# Patient Record
Sex: MALE | Race: WHITE | HISPANIC OR LATINO | Employment: FULL TIME | URBAN - NONMETROPOLITAN AREA
[De-identification: names, ages, dates, MRNs, and addresses within clinical notes are randomized per-mention and may not be internally consistent; named-entity substitution may affect disease eponyms.]

---

## 2023-01-29 ENCOUNTER — HOSPITAL ENCOUNTER (EMERGENCY)
Facility: HOSPITAL | Age: 28
Discharge: HOME | End: 2023-01-29
Attending: EMERGENCY MEDICINE

## 2023-01-29 ENCOUNTER — APPOINTMENT (EMERGENCY)
Dept: CT IMAGING | Facility: HOSPITAL | Age: 28
End: 2023-01-29

## 2023-01-29 VITALS
RESPIRATION RATE: 16 BRPM | OXYGEN SATURATION: 98 % | HEART RATE: 84 BPM | TEMPERATURE: 98.4 F | WEIGHT: 175 LBS | SYSTOLIC BLOOD PRESSURE: 119 MMHG | DIASTOLIC BLOOD PRESSURE: 66 MMHG

## 2023-01-29 DIAGNOSIS — M89.8X5: Primary | ICD-10-CM

## 2023-01-29 LAB
ALBUMIN SERPL BCP-MCNC: 4.2 G/DL (ref 3.5–5)
ALP SERPL-CCNC: 94 U/L (ref 34–104)
ALT SERPL W P-5'-P-CCNC: 16 U/L (ref 7–52)
ANION GAP SERPL CALCULATED.3IONS-SCNC: 10 MMOL/L (ref 4–13)
AST SERPL W P-5'-P-CCNC: 14 U/L (ref 13–39)
BASOPHILS # BLD AUTO: 0.02 THOUSANDS/ÂΜL (ref 0–0.1)
BASOPHILS NFR BLD AUTO: 0 % (ref 0–1)
BILIRUB SERPL-MCNC: 0.6 MG/DL (ref 0.2–1)
BUN SERPL-MCNC: 15 MG/DL (ref 5–25)
CALCIUM SERPL-MCNC: 9.1 MG/DL (ref 8.4–10.2)
CHLORIDE SERPL-SCNC: 101 MMOL/L (ref 96–108)
CO2 SERPL-SCNC: 26 MMOL/L (ref 21–32)
CREAT SERPL-MCNC: 0.73 MG/DL (ref 0.6–1.3)
EOSINOPHIL # BLD AUTO: 0.16 THOUSAND/ÂΜL (ref 0–0.61)
EOSINOPHIL NFR BLD AUTO: 2 % (ref 0–6)
ERYTHROCYTE [DISTWIDTH] IN BLOOD BY AUTOMATED COUNT: 12.2 % (ref 11.6–15.1)
GFR SERPL CREATININE-BSD FRML MDRD: 128 ML/MIN/1.73SQ M
GLUCOSE SERPL-MCNC: 127 MG/DL (ref 65–140)
HCT VFR BLD AUTO: 45.9 % (ref 36.5–49.3)
HGB BLD-MCNC: 15.4 G/DL (ref 12–17)
IMM GRANULOCYTES # BLD AUTO: 0.04 THOUSAND/UL (ref 0–0.2)
IMM GRANULOCYTES NFR BLD AUTO: 0 % (ref 0–2)
LYMPHOCYTES # BLD AUTO: 2.48 THOUSANDS/ÂΜL (ref 0.6–4.47)
LYMPHOCYTES NFR BLD AUTO: 23 % (ref 14–44)
MCH RBC QN AUTO: 28.9 PG (ref 26.8–34.3)
MCHC RBC AUTO-ENTMCNC: 33.6 G/DL (ref 31.4–37.4)
MCV RBC AUTO: 86 FL (ref 82–98)
MONOCYTES # BLD AUTO: 0.78 THOUSAND/ÂΜL (ref 0.17–1.22)
MONOCYTES NFR BLD AUTO: 7 % (ref 4–12)
NEUTROPHILS # BLD AUTO: 7.36 THOUSANDS/ÂΜL (ref 1.85–7.62)
NEUTS SEG NFR BLD AUTO: 68 % (ref 43–75)
NRBC BLD AUTO-RTO: 0 /100 WBCS
PLATELET # BLD AUTO: 280 THOUSANDS/UL (ref 149–390)
PMV BLD AUTO: 10.5 FL (ref 8.9–12.7)
POTASSIUM SERPL-SCNC: 3.2 MMOL/L (ref 3.5–5.3)
PROT SERPL-MCNC: 7.4 G/DL (ref 6.4–8.4)
RBC # BLD AUTO: 5.33 MILLION/UL (ref 3.88–5.62)
SODIUM SERPL-SCNC: 137 MMOL/L (ref 135–147)
WBC # BLD AUTO: 10.84 THOUSAND/UL (ref 4.31–10.16)

## 2023-01-29 RX ADMIN — IOHEXOL 98 ML: 350 INJECTION, SOLUTION INTRAVENOUS at 04:32

## 2023-02-02 ENCOUNTER — TELEPHONE (OUTPATIENT)
Dept: OBGYN CLINIC | Facility: CLINIC | Age: 28
End: 2023-02-02

## 2023-02-02 NOTE — TELEPHONE ENCOUNTER
---Tried several times through out the day  Message on his phone said subscriber is not in service     -- Message from Leopoldo Holland sent at 2/1/2023  6:01 PM EST -----  This patient has x-rays that were done at [facility] in 2020  It would be best if this patient can get these x-rays to be used in comparison to new x-rays  I would suggest he be contacted and informed that it is very important for old x-rays to be available for review    Thank you

## 2023-02-03 ENCOUNTER — OFFICE VISIT (OUTPATIENT)
Dept: OBGYN CLINIC | Facility: CLINIC | Age: 28
End: 2023-02-03

## 2023-02-03 ENCOUNTER — HOSPITAL ENCOUNTER (OUTPATIENT)
Dept: RADIOLOGY | Facility: CLINIC | Age: 28
End: 2023-02-03

## 2023-02-03 VITALS
HEART RATE: 89 BPM | WEIGHT: 188 LBS | TEMPERATURE: 98 F | BODY MASS INDEX: 24.92 KG/M2 | HEIGHT: 73 IN | SYSTOLIC BLOOD PRESSURE: 122 MMHG | DIASTOLIC BLOOD PRESSURE: 75 MMHG

## 2023-02-03 DIAGNOSIS — D16.22 OSTEOCHONDROMA OF FEMUR, LEFT: ICD-10-CM

## 2023-02-03 DIAGNOSIS — M79.605 LEFT LEG PAIN: Primary | ICD-10-CM

## 2023-02-03 DIAGNOSIS — D16.22 OSTEOCHONDROMA OF FEMUR, LEFT: Primary | ICD-10-CM

## 2023-02-03 NOTE — PROGRESS NOTES
ASSESSMENT/PLAN:    Diagnoses and all orders for this visit:    Left leg pain  -     MRI femur left w wo contrast; Future    Osteochondroma of femur, left  -     XR femur 2 vw left; Future  -     MRI femur left w wo contrast; Future        Plan: I have ordered an MRI both with and without contrast to assess this lesion for the potential for malignant degeneration due to the reported growth occurring post physiologic maturity  I will contact him once the MRIs been completed  I will refer him to Dr Enzo Nolan for definitive treatment due to the size of this lesion  I have encouraged him to contact me if any questions or concerns arise in the interim  He does understand that additional blood work may be necessary and I will attempt to order this prior to his visit with Dr Enzo Nolan if necessary per conversation with Dr Enzo Nolan  Return We will contact patient after MRI completed  _____________________________________________________  CHIEF COMPLAINT:  Chief Complaint   Patient presents with   • Left Thigh - Pain         SUBJECTIVE:  [place] is a 24y o  year old male who presents for a mass of his left thigh  He states he first noticed this about 6 or 7 years ago and believes it has increased in size over the past 4 years  He notes pain with activity and states that when he straightens his knee he has to do so in such a way as to avoid significant pain  He denies lower extremity paresthesias  He is fully active although he does have to alter the way he does certain things to minimize pain  He denies any systemic symptoms  He was seen in [place], [place] for this in 2020 at a local emergency room  However, he never did seek follow-up  He presents now having been recently seen in the emergency room at [facility] at the encouragement of his girlfriend  PAST MEDICAL HISTORY:  History reviewed  No pertinent past medical history  PAST SURGICAL HISTORY:  History reviewed   No pertinent surgical history  FAMILY HISTORY:  History reviewed  No pertinent family history  SOCIAL HISTORY:  Social History     Tobacco Use   • Smoking status: Never   • Smokeless tobacco: Never   Vaping Use   • Vaping Use: Never used   Substance Use Topics   • Alcohol use: Not Currently       MEDICATIONS:  No current outpatient medications on file  ALLERGIES:  No Known Allergies    Review of systems:   Constitutional: Negative for fatigue, fever or loss of apetite  HENT: Negative  Respiratory: Negative for shortness of breath, dyspnea  Cardiovascular: Negative for chest pain/tightness  Gastrointestinal: Negative for abdominal pain, N/V  Endocrine: Negative for cold/heat intolerance, unexplained weight loss/gain  Genitourinary: Negative for flank pain, dysuria, hematuria  Musculoskeletal: Positive as in the HPI  Skin: Negative for rash  Neurological: Negative  Psychiatric/Behavioral: Negative for agitation  _____________________________________________________  PHYSICAL EXAMINATION:    Blood pressure 122/75, pulse 89, temperature 98 °F (36 7 °C), temperature source Temporal, height 6' 1" (1 854 m), weight 85 3 kg (188 lb)  General: well developed and well nourished, alert, oriented times 3 and appears comfortable  Psychiatric: Normal  HEENT: Benign  Cardiovascular: Regular    Pulmonary: No wheezing or stridor  Abdomen: Soft, Nontender  Skin: No masses, erythema, lacerations, fluctation, ulcerations  Neurovascular: Motor and sensory exams are intact in both lower extremities  Pulses are palpable  MUSCULOSKELETAL EXAMINATION:    The left lower extremity exam demonstrates a large mass in the anteromedial aspect of the distal left thigh  There is no localized tenderness with palpation  He has excellent range of motion of his hip without complaints  He has good range of motion of the knee but flexion of greater than 120 degrees does trigger complaints of pain localized to the mass  There is no erythema or rubor  The left lower leg, ankle and foot exam are benign  There are no additional palpable masses elsewhere  The right lower extremity exam is benign with no palpable masses  Bilateral upper extremity exam demonstrates no palpable masses  No palpable masses of the scapula       _____________________________________________________  STUDIES REVIEWED:  The x-rays of his left femur obtained today demonstrate findings consistent with a benign osteochondroma of the anteromedial aspect of the distal portion of the left femoral shaft  There is no evidence of any additional osteochondromas noted either proximally about the hip or distally about the knee  The CT scan obtained in the emergency room on 1/29/2023 demonstrates a benign-appearing osteochondroma          Marigene Arts

## 2024-05-16 ENCOUNTER — APPOINTMENT (EMERGENCY)
Dept: CT IMAGING | Facility: HOSPITAL | Age: 29
End: 2024-05-16
Payer: COMMERCIAL

## 2024-05-16 ENCOUNTER — HOSPITAL ENCOUNTER (EMERGENCY)
Facility: HOSPITAL | Age: 29
Discharge: HOME | End: 2024-05-16
Attending: EMERGENCY MEDICINE
Payer: COMMERCIAL

## 2024-05-16 VITALS
DIASTOLIC BLOOD PRESSURE: 67 MMHG | HEART RATE: 81 BPM | SYSTOLIC BLOOD PRESSURE: 115 MMHG | TEMPERATURE: 97.3 F | OXYGEN SATURATION: 95 % | RESPIRATION RATE: 18 BRPM

## 2024-05-16 DIAGNOSIS — R51.9 LEFT FACIAL PAIN: ICD-10-CM

## 2024-05-16 DIAGNOSIS — R20.0 LEFT FACIAL NUMBNESS: Primary | ICD-10-CM

## 2024-05-16 LAB
AMPHETAMINES SERPL QL SCN: NEGATIVE
ANION GAP SERPL CALCULATED.3IONS-SCNC: 10 MMOL/L (ref 4–13)
APTT PPP: 27 SECONDS (ref 23–37)
ATRIAL RATE: 97 BPM
BARBITURATES UR QL: NEGATIVE
BENZODIAZ UR QL: NEGATIVE
BUN SERPL-MCNC: 16 MG/DL (ref 5–25)
CALCIUM SERPL-MCNC: 9.2 MG/DL (ref 8.4–10.2)
CARDIAC TROPONIN I PNL SERPL HS: <2 NG/L
CHLORIDE SERPL-SCNC: 101 MMOL/L (ref 96–108)
CO2 SERPL-SCNC: 26 MMOL/L (ref 21–32)
COCAINE UR QL: NEGATIVE
CREAT SERPL-MCNC: 0.85 MG/DL (ref 0.6–1.3)
ERYTHROCYTE [DISTWIDTH] IN BLOOD BY AUTOMATED COUNT: 11.7 % (ref 11.6–15.1)
ETHANOL SERPL-MCNC: <10 MG/DL
FENTANYL UR QL SCN: NEGATIVE
FLUAV RNA RESP QL NAA+PROBE: NEGATIVE
FLUBV RNA RESP QL NAA+PROBE: NEGATIVE
GFR SERPL CREATININE-BSD FRML MDRD: 120 ML/MIN/1.73SQ M
GLUCOSE SERPL-MCNC: 115 MG/DL (ref 65–140)
GLUCOSE SERPL-MCNC: 125 MG/DL (ref 65–140)
HCT VFR BLD AUTO: 47.5 % (ref 36.5–49.3)
HGB BLD-MCNC: 16.9 G/DL (ref 12–17)
HYDROCODONE UR QL SCN: NEGATIVE
INR PPP: 1.11 (ref 0.84–1.19)
MCH RBC QN AUTO: 29.8 PG (ref 26.8–34.3)
MCHC RBC AUTO-ENTMCNC: 35.6 G/DL (ref 31.4–37.4)
MCV RBC AUTO: 84 FL (ref 82–98)
METHADONE UR QL: NEGATIVE
OPIATES UR QL SCN: NEGATIVE
OXYCODONE+OXYMORPHONE UR QL SCN: NEGATIVE
P AXIS: 13 DEGREES
PCP UR QL: NEGATIVE
PLATELET # BLD AUTO: 246 THOUSANDS/UL (ref 149–390)
PMV BLD AUTO: 10.9 FL (ref 8.9–12.7)
POTASSIUM SERPL-SCNC: 2.8 MMOL/L (ref 3.5–5.3)
POTASSIUM SERPL-SCNC: 4.2 MMOL/L (ref 3.5–5.3)
PR INTERVAL: 110 MS
PROTHROMBIN TIME: 14.6 SECONDS (ref 11.6–14.5)
QRS AXIS: 92 DEGREES
QRSD INTERVAL: 98 MS
QT INTERVAL: 366 MS
QTC INTERVAL: 464 MS
RBC # BLD AUTO: 5.68 MILLION/UL (ref 3.88–5.62)
RSV RNA RESP QL NAA+PROBE: NEGATIVE
SARS-COV-2 RNA RESP QL NAA+PROBE: NEGATIVE
SODIUM SERPL-SCNC: 137 MMOL/L (ref 135–147)
T WAVE AXIS: 28 DEGREES
THC UR QL: POSITIVE
VENTRICULAR RATE: 97 BPM
WBC # BLD AUTO: 8.67 THOUSAND/UL (ref 4.31–10.16)

## 2024-05-16 PROCEDURE — 84484 ASSAY OF TROPONIN QUANT: CPT | Performed by: EMERGENCY MEDICINE

## 2024-05-16 PROCEDURE — 99285 EMERGENCY DEPT VISIT HI MDM: CPT | Performed by: EMERGENCY MEDICINE

## 2024-05-16 PROCEDURE — 82948 REAGENT STRIP/BLOOD GLUCOSE: CPT

## 2024-05-16 PROCEDURE — 93005 ELECTROCARDIOGRAM TRACING: CPT

## 2024-05-16 PROCEDURE — 80048 BASIC METABOLIC PNL TOTAL CA: CPT | Performed by: EMERGENCY MEDICINE

## 2024-05-16 PROCEDURE — 99284 EMERGENCY DEPT VISIT MOD MDM: CPT

## 2024-05-16 PROCEDURE — 84132 ASSAY OF SERUM POTASSIUM: CPT | Performed by: EMERGENCY MEDICINE

## 2024-05-16 PROCEDURE — 70496 CT ANGIOGRAPHY HEAD: CPT

## 2024-05-16 PROCEDURE — 85027 COMPLETE CBC AUTOMATED: CPT | Performed by: EMERGENCY MEDICINE

## 2024-05-16 PROCEDURE — 85730 THROMBOPLASTIN TIME PARTIAL: CPT | Performed by: EMERGENCY MEDICINE

## 2024-05-16 PROCEDURE — 80307 DRUG TEST PRSMV CHEM ANLYZR: CPT | Performed by: EMERGENCY MEDICINE

## 2024-05-16 PROCEDURE — 85610 PROTHROMBIN TIME: CPT | Performed by: EMERGENCY MEDICINE

## 2024-05-16 PROCEDURE — 96365 THER/PROPH/DIAG IV INF INIT: CPT

## 2024-05-16 PROCEDURE — 82077 ASSAY SPEC XCP UR&BREATH IA: CPT | Performed by: EMERGENCY MEDICINE

## 2024-05-16 PROCEDURE — 0241U HB NFCT DS VIR RESP RNA 4 TRGT: CPT | Performed by: EMERGENCY MEDICINE

## 2024-05-16 PROCEDURE — 36415 COLL VENOUS BLD VENIPUNCTURE: CPT | Performed by: EMERGENCY MEDICINE

## 2024-05-16 PROCEDURE — 70498 CT ANGIOGRAPHY NECK: CPT

## 2024-05-16 PROCEDURE — 96375 TX/PRO/DX INJ NEW DRUG ADDON: CPT

## 2024-05-16 RX ORDER — CLINDAMYCIN HYDROCHLORIDE 150 MG/1
450 CAPSULE ORAL EVERY 8 HOURS
Qty: 63 CAPSULE | Refills: 0 | Status: SHIPPED | OUTPATIENT
Start: 2024-05-16 | End: 2024-05-23

## 2024-05-16 RX ORDER — POTASSIUM CHLORIDE 20MEQ/15ML
40 LIQUID (ML) ORAL ONCE
Status: COMPLETED | OUTPATIENT
Start: 2024-05-16 | End: 2024-05-16

## 2024-05-16 RX ORDER — POTASSIUM CHLORIDE 14.9 MG/ML
20 INJECTION INTRAVENOUS ONCE
Status: COMPLETED | OUTPATIENT
Start: 2024-05-16 | End: 2024-05-16

## 2024-05-16 RX ORDER — KETOROLAC TROMETHAMINE 30 MG/ML
15 INJECTION, SOLUTION INTRAMUSCULAR; INTRAVENOUS ONCE
Status: COMPLETED | OUTPATIENT
Start: 2024-05-16 | End: 2024-05-16

## 2024-05-16 RX ORDER — LORAZEPAM 2 MG/ML
0.5 INJECTION INTRAMUSCULAR ONCE
Status: COMPLETED | OUTPATIENT
Start: 2024-05-16 | End: 2024-05-16

## 2024-05-16 RX ADMIN — POTASSIUM CHLORIDE 40 MEQ: 20 SOLUTION ORAL at 05:54

## 2024-05-16 RX ADMIN — IOHEXOL 85 ML: 350 INJECTION, SOLUTION INTRAVENOUS at 05:18

## 2024-05-16 RX ADMIN — KETOROLAC TROMETHAMINE 15 MG: 30 INJECTION, SOLUTION INTRAMUSCULAR at 05:19

## 2024-05-16 RX ADMIN — POTASSIUM CHLORIDE 20 MEQ: 14.9 INJECTION, SOLUTION INTRAVENOUS at 05:54

## 2024-05-16 RX ADMIN — LORAZEPAM 0.5 MG: 2 INJECTION INTRAMUSCULAR; INTRAVENOUS at 05:19

## 2024-05-16 NOTE — QUICK NOTE
Stroke Alert Note   Manjit Rasmussen 28 y.o. male  MRN: [id number]   Unit/Bed#:  Encounter: [id number]     Stroke alert text received at: 4:52am  Call from  received at: 4:53am  Neurology response by phone was immediate    28M presented with left facial numbness/pain and headache that started about an hour prior to arrival when he was getting home from work.  He initially also reported left arm numbness which led to the stroke alert activation by nursing, however, when the ED attending evaluated the patient, he reported that the numbness had quickly progressed into both arm and both legs and was symmetric.  Exam remarkable for left facial numbness, reproducible pain in areas over the left side of his face (TMJ, temple), and the patient was noted to be anxious, tearful, and withdrawn.  Additional history obtained by the ED attending after the imaging was completed included that the patient had seen a dentist a couple of weeks ago for symptoms on the left side of his face and he was supposed to have followed up with them because of the problems that had been found.    NIHSSS 0-1 (ED provider did not give a point for left facial numbness but did document it in his note).    CT head wo: unremarkable  CTA head/neck: unremarkable    Thrombolytic decision: Patient not a candidate. No suspicion for stroke at this time.  In discussion with the ED provider, more likely symptoms dental/musculoskeletal, with the severity of the pain causing him to hyperventilate and cause the sensory symptoms in his extremities.  -no indication for stroke pathway or associated workup/meds  -no additional recommendations at this time.      Emerson Cheney MD

## 2024-05-16 NOTE — ED PROVIDER NOTES
"History  Chief Complaint   Patient presents with   • Facial Pain     Left sided facial pain 10/10 x 1 hour.      Patient is a 28-year-old male who came home from work by an hour ago and began describing left-sided facial pain and \"numbness.\"  He initially told nursing staff that he was feeling some numbness in his left arm as well, however, upon my questioning patient reveals that he is having numbness in both arms both hands both lower extremities.  Patient was ambulatory into the department.  No chest pain or shortness of breath.      History provided by:  Patient      None       History reviewed. No pertinent past medical history.    History reviewed. No pertinent surgical history.    History reviewed. No pertinent family history.  I have reviewed and agree with the history as documented.    E-Cigarette/Vaping   • E-Cigarette Use Never User      E-Cigarette/Vaping Substances     Social History     Tobacco Use   • Smoking status: Never   • Smokeless tobacco: Never   Vaping Use   • Vaping status: Never Used   Substance Use Topics   • Alcohol use: Not Currently   • Drug use: Not Currently       Review of Systems   Constitutional:  Negative for chills and fever.   Respiratory:  Negative for shortness of breath and wheezing.    Cardiovascular:  Negative for chest pain and palpitations.   Gastrointestinal:  Negative for diarrhea, nausea and vomiting.   Neurological:  Positive for numbness and headaches. Negative for dizziness, tremors, seizures, syncope, facial asymmetry, speech difficulty, weakness and light-headedness.       Physical Exam  Physical Exam  Vitals and nursing note reviewed.   Constitutional:       General: He is not in acute distress.     Appearance: He is normal weight. He is not ill-appearing or toxic-appearing.   HENT:      Head: Normocephalic and atraumatic.      Right Ear: External ear normal.      Left Ear: External ear normal.      Nose: Nose normal.      Mouth/Throat:      Mouth: Mucous " membranes are moist.   Cardiovascular:      Rate and Rhythm: Tachycardia present.   Pulmonary:      Effort: Pulmonary effort is normal. No respiratory distress.      Breath sounds: No wheezing.   Abdominal:      General: Abdomen is flat. There is no distension.   Musculoskeletal:         General: No signs of injury.   Skin:     Coloration: Skin is not pale.   Neurological:      Mental Status: He is alert.      Cranial Nerves: No cranial nerve deficit.      Sensory: Sensory deficit (Subjective numbness to the left side of his face.) present.      Motor: No weakness.   Psychiatric:         Mood and Affect: Mood is anxious. Affect is tearful.         Behavior: Behavior is withdrawn.         Vital Signs  ED Triage Vitals   Temperature Pulse Respirations Blood Pressure SpO2   05/16/24 0453 05/16/24 0453 05/16/24 0453 05/16/24 0453 05/16/24 0453   (!) 97.3 °F (36.3 °C) 96 19 158/92 99 %      Temp src Heart Rate Source Patient Position - Orthostatic VS BP Location FiO2 (%)   -- 05/16/24 0453 05/16/24 0453 -- --    Monitor Lying        Pain Score       05/16/24 0519       7           Vitals:    05/16/24 0500 05/16/24 0515 05/16/24 0530 05/16/24 0545   BP: 158/92 132/78 123/75 120/72   Pulse: 98 95 78 77   Patient Position - Orthostatic VS: Lying Lying Lying Lying         Visual Acuity  Visual Acuity      Flowsheet Row Most Recent Value   L Pupil Size (mm) 2   R Pupil Size (mm) 2            ED Medications  Medications   potassium chloride 20 mEq IVPB (premix) (20 mEq Intravenous New Bag 5/16/24 0554)   LORazepam (ATIVAN) injection 0.5 mg (0.5 mg Intravenous Given 5/16/24 0519)   ketorolac (TORADOL) injection 15 mg (15 mg Intravenous Given 5/16/24 0519)   iohexol (OMNIPAQUE) 350 MG/ML injection (MULTI-DOSE) 85 mL (85 mL Intravenous Given 5/16/24 0518)   potassium chloride oral solution 40 mEq (40 mEq Oral Given 5/16/24 0554)       Diagnostic Studies  Results Reviewed       Procedure Component Value Units Date/Time    Rapid  drug screen, urine [[id number]]  (Abnormal) Collected: 05/16/24 0538    Lab Status: Final result Specimen: Urine, Clean Catch Updated: 05/16/24 0604     Amph/Meth UR Negative     Barbiturate Ur Negative     Benzodiazepine Urine Negative     Cocaine Urine Negative     Methadone Urine Negative     Opiate Urine Negative     PCP Ur Negative     THC Urine Positive     Oxycodone Urine Negative     Fentanyl Urine Negative     HYDROCODONE URINE Negative    Narrative:      Presumptive report. If requested, specimen will be sent to reference lab for confirmation.  FOR MEDICAL PURPOSES ONLY.   IF CONFIRMATION NEEDED PLEASE CONTACT THE LAB WITHIN 5 DAYS.    Drug Screen Cutoff Levels:  AMPHETAMINE/METHAMPHETAMINES  1000 ng/mL  BARBITURATES     200 ng/mL  BENZODIAZEPINES     200 ng/mL  COCAINE      300 ng/mL  METHADONE      300 ng/mL  OPIATES      300 ng/mL  PHENCYCLIDINE     25 ng/mL  THC       50 ng/mL  OXYCODONE      100 ng/mL  FENTANYL      5 ng/mL  HYDROCODONE     300 ng/mL    Protime-INR [[id number]]  (Abnormal) Collected: 05/16/24 0504    Lab Status: Final result Specimen: Blood from Arm, Right Updated: 05/16/24 0543     Protime 14.6 seconds      INR 1.11    APTT [[id number]]  (Normal) Collected: 05/16/24 0504    Lab Status: Final result Specimen: Blood from Arm, Right Updated: 05/16/24 0543     PTT 27 seconds     HS Troponin I 2hr [[id number]]     Lab Status: No result Specimen: Blood     HS Troponin 0hr (reflex protocol) [[id number]]  (Normal) Collected: 05/16/24 0504    Lab Status: Final result Specimen: Blood from Arm, Right Updated: 05/16/24 0539     hs TnI 0hr <2 ng/L     FLU/RSV/COVID - if FLU/RSV clinically relevant [[id number]] Collected: 05/16/24 0523    Lab Status: In process Specimen: Nares from Nose Updated: 05/16/24 0535    Ethanol [[id number]]  (Normal) Collected: 05/16/24 0504    Lab Status: Final result Specimen: Blood from Arm, Right Updated: 05/16/24 0534     Ethanol Lvl <10 mg/dL     Basic metabolic panel  [[id number]]  (Abnormal) Collected: 05/16/24 0504    Lab Status: Final result Specimen: Blood from Arm, Right Updated: 05/16/24 0534     Sodium 137 mmol/L      Potassium 2.8 mmol/L      Chloride 101 mmol/L      CO2 26 mmol/L      ANION GAP 10 mmol/L      BUN 16 mg/dL      Creatinine 0.85 mg/dL      Glucose 125 mg/dL      Calcium 9.2 mg/dL      eGFR 120 ml/min/1.73sq m     Narrative:      National Kidney Disease Foundation guidelines for Chronic Kidney Disease (CKD):   •  Stage 1 with normal or high GFR (GFR > 90 mL/min/1.73 square meters)  •  Stage 2 Mild CKD (GFR = 60-89 mL/min/1.73 square meters)  •  Stage 3A Moderate CKD (GFR = 45-59 mL/min/1.73 square meters)  •  Stage 3B Moderate CKD (GFR = 30-44 mL/min/1.73 square meters)  •  Stage 4 Severe CKD (GFR = 15-29 mL/min/1.73 square meters)  •  Stage 5 End Stage CKD (GFR <15 mL/min/1.73 square meters)  Note: GFR calculation is accurate only with a steady state creatinine    Fingerstick Glucose (POCT) [[id number]]  (Normal) Collected: 05/16/24 0517    Lab Status: Final result Specimen: Blood Updated: 05/16/24 0518     POC Glucose 115 mg/dl     CBC and Platelet [[id number]]  (Abnormal) Collected: 05/16/24 0504    Lab Status: Final result Specimen: Blood from Arm, Right Updated: 05/16/24 0512     WBC 8.67 Thousand/uL      RBC 5.68 Million/uL      Hemoglobin 16.9 g/dL      Hematocrit 47.5 %      MCV 84 fL      MCH 29.8 pg      MCHC 35.6 g/dL      RDW 11.7 %      Platelets 246 Thousands/uL      MPV 10.9 fL                    CTA stroke alert (head/neck)   Final Result by Beni Conway MD (05/16 0602)      No large vessel occlusion. No focal stenosis or saccular aneurysm within the [place].      No hemodynamically significant stenosis within either common or internal carotid artery. Less than 50% stenosis by NASCET criteria.      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       No soft tissue inflammatory changes.         I personally discussed this study with Dr Cheney on 5/16/2024 5:34 AM.                        Workstation performed: [id number]         CT stroke alert brain   Final Result by Beni Conway MD (05/16 0536)      No acute intracranial abnormality.               I personally discussed this study with Dr Cheney on 5/16/2024 5:34 AM.            Workstation performed: [id number]                    Procedures  ECG 12 Lead Documentation Only    Date/Time: 5/16/2024 5:06 AM    Performed by: Mateo Huynh DO  Authorized by: Mateo Huynh DO    Indications / Diagnosis:  Chest pain  ECG reviewed by me, the ED Provider: yes    Patient location:  ED  Interpretation:     Interpretation: normal    Rate:     ECG rate assessment: tachycardic    Rhythm:     Rhythm: sinus tachycardia    Ectopy:     Ectopy: none    QRS:     QRS axis:  Normal  Conduction:     Conduction: normal    ST segments:     ST segments:  Normal  T waves:     T waves: normal             ED Course  ED Course as of 05/16/24 0615   u May 16, 2024   0457 Patient is stroke alert at this time however I feel that this is mostly consistent with anxiety and hyperventilation   0500 Case was discussed with stroke neurologist.   0545 Further evaluation of the patient at this time reveals him to have discrete areas of tenderness over his left TMJ and his left temporal.    Patient reports to me that he had recently seen a dentist a couple weeks ago and had been advised to follow-up with them secondary to dental issues.    I believe the diagnosis here to be most likely TMJ, dental, lymphadenitis, musculoskeletal.  No evidence of stroke.   0548 Potassium(!): 2.8   0549 Patient has been ambulatory without difficulty after returning from CAT scan.    CT readings are thus far negative.    Will replace potassium.   0612 Chronic bony findings.  No acute findings.   0612 Will replace potassium.  Likely discharge to home.   Signed out to Dr. Long   0612 THC URINE(!): Positive                  Stroke Assessment       Row Name 05/16/24 0504             NIH Stroke Scale    Interval Baseline      Level of Consciousness (1a.) 0      LOC Questions (1b.) 0      LOC Commands (1c.) 0      Best Gaze (2.) 0      Visual (3.) 0      Facial Palsy (4.) 0      Motor Arm, Left (5a.) 0      Motor Arm, Right (5b.) 0      Motor Leg, Left (6a.) 0      Motor Leg, Right (6b.) 0      Limb Ataxia (7.) 0      Sensory (8.) 0      Best Language (9.) 0      Dysarthria (10.) 0      Extinction and Inattention (11.) (Formerly Neglect) 0      Total 0                    Flowsheet Row Most Recent Value   Thrombolytic Decision Options    Thrombolytic Decision Patient not a candidate.   Patient is not a candidate options Symptoms resolved/clearly non disabling.                                    Medical Decision Making  Patient presented to the emergency department and a MSE was performed. The patient was evaluated for complaint related to acute strokelike symptoms.  Patient is potentially at risk for, but not limited to, thrombotic stroke, embolic stroke, hemorrhagic stroke, hypertensive emergency, hypoglycemic episode, or migraine variant.  Several of these diagnoses have been evaluated and ruled out by history and physical.  As needed, patient will be further evaluated with laboratory and imaging studies.  Higher level diagnostics, such as CT imaging or ultrasound, may also be required.  Please see work-up portion of the note for further evaluation of patient's risk.  Socioeconomic factors were also considered as part of the decision-making process.  Unless otherwise stated in the chart or patient is admitted as elsewhere documented, any previously prescribed medications will be maintained.        Problems Addressed:  Left facial numbness: acute illness or injury    Amount and/or Complexity of Data Reviewed  Labs: ordered. Decision-making details documented in ED  Course.  Radiology: ordered.    Risk  Prescription drug management.             Disposition  Final diagnoses:   Left facial numbness   Left facial pain     Time reflects when diagnosis was documented in both MDM as applicable and the Disposition within this note       Time User Action Codes Description Comment    5/16/2024  5:00 AM Mateo Huynh [R20.0] Left facial numbness     5/16/2024  5:50 AM Mateo Huynh [R51.9] Left facial pain           ED Disposition       ED Disposition   Discharge    Condition   Stable    Date/Time   Thu May 16, 2024  6:07 AM    Comment   Manjit Rasmussen discharge to home/self care.                   Follow-up Information    None         Patient's Medications   Discharge Prescriptions    CLINDAMYCIN (CLEOCIN) 150 MG CAPSULE    Take 3 capsules (450 mg total) by mouth every 8 (eight) hours for 7 days       Start Date: 5/16/2024 End Date: 5/23/2024       Order Dose: 450 mg       Quantity: 63 capsule    Refills: 0       No discharge procedures on file.    PDMP Review       None            ED Provider  Electronically Signed by             Mateo Huynh DO  05/16/24 0623

## 2024-05-16 NOTE — DISCHARGE INSTRUCTIONS
We recommend that you have continued follow-up with your dentist.  Return with any worsening symptoms.  Take antibiotics as prescribed.

## 2025-02-28 NOTE — ED PROVIDER NOTES
History  Chief Complaint   Patient presents with   • Rash     Generalized intermittent rash for the past 2 weeks  Patient presents for evaluation of mass of left leg  States he has a mass on his left leg above his left knee which has been present for several years  States it is sometimes painful  History provided by:  Patient   used: No    Leg Pain  Location:  Leg  Leg location:  R upper leg  Pain details:     Quality:  Aching (mass)    Radiates to:  Does not radiate    Severity:  Moderate    Onset quality:  Gradual    Duration: years  Progression:  Unchanged  Relieved by:  Nothing  Worsened by:  Nothing  Ineffective treatments:  None tried  Associated symptoms: no decreased ROM, no fever, no muscle weakness, no neck pain, no numbness, no stiffness, no swelling and no tingling        None       History reviewed  No pertinent past medical history  History reviewed  No pertinent surgical history  History reviewed  No pertinent family history  I have reviewed and agree with the history as documented  E-Cigarette/Vaping     E-Cigarette/Vaping Substances          Review of Systems   Constitutional: Negative for chills and fever  HENT: Negative for ear pain, hearing loss, sore throat, trouble swallowing and voice change  Eyes: Negative for pain and discharge  Respiratory: Negative for cough, shortness of breath and wheezing  Cardiovascular: Negative for chest pain and palpitations  Gastrointestinal: Negative for abdominal pain, blood in stool, constipation, diarrhea, nausea and vomiting  Genitourinary: Negative for dysuria, flank pain, frequency and hematuria  Musculoskeletal: Negative for joint swelling, neck pain, neck stiffness and stiffness  Skin: Negative for rash and wound  Neurological: Negative for dizziness, seizures, syncope, facial asymmetry and headaches  Psychiatric/Behavioral: Negative for hallucinations, self-injury and suicidal ideas     All Juan's eye is red and there is green thick drainage. Advised mom to start the Tobra. Mom is wondering why Juan has gotten pink eye 4 times in the last 2 months and is wondering if there is something else that she can do for him or if there is another medications that can be prescribed. Please advise.       [phone]     other systems reviewed and are negative  Physical Exam  Physical Exam  Vitals and nursing note reviewed  Constitutional:       General: He is not in acute distress  Appearance: He is well-developed  HENT:      Head: Normocephalic and atraumatic  Right Ear: External ear normal       Left Ear: External ear normal    Eyes:      General: No scleral icterus  Right eye: No discharge  Left eye: No discharge  Extraocular Movements: Extraocular movements intact  Conjunctiva/sclera: Conjunctivae normal    Cardiovascular:      Rate and Rhythm: Normal rate and regular rhythm  Heart sounds: Normal heart sounds  No murmur heard  Pulmonary:      Effort: Pulmonary effort is normal       Breath sounds: Normal breath sounds  No wheezing or rales  Abdominal:      General: Bowel sounds are normal  There is no distension  Palpations: Abdomen is soft  Tenderness: There is no abdominal tenderness  There is no guarding or rebound  Musculoskeletal:         General: No deformity  Normal range of motion  Cervical back: Normal range of motion and neck supple  Comments: Large firm mass noted in left leg superior to knee  Mass is firm and nontender  Not mobile  Skin:     General: Skin is warm and dry  Findings: No rash  Neurological:      General: No focal deficit present  Mental Status: He is alert and oriented to person, place, and time  Cranial Nerves: No cranial nerve deficit  Psychiatric:         Mood and Affect: Mood normal          Behavior: Behavior normal          Thought Content:  Thought content normal          Judgment: Judgment normal          Vital Signs  ED Triage Vitals [01/29/23 0333]   Temperature Pulse Respirations Blood Pressure SpO2   98 4 °F (36 9 °C) 91 17 143/74 100 %      Temp Source Heart Rate Source Patient Position - Orthostatic VS BP Location FiO2 (%)   Temporal Monitor -- Right arm --      Pain Score       No Pain Vitals:    01/29/23 0333   BP: 143/74   Pulse: 91         Visual Acuity      ED Medications  Medications   iohexol (OMNIPAQUE) 350 MG/ML injection (SINGLE-DOSE) 98 mL (98 mL Intravenous Given 1/29/23 0432)       Diagnostic Studies  Results Reviewed     Procedure Component Value Units Date/Time    Comprehensive metabolic panel [[id number]]  (Abnormal) Collected: 01/29/23 0342    Lab Status: Final result Specimen: Blood from Arm, Right Updated: 01/29/23 0412     Sodium 137 mmol/L      Potassium 3 2 mmol/L      Chloride 101 mmol/L      CO2 26 mmol/L      ANION GAP 10 mmol/L      BUN 15 mg/dL      Creatinine 0 73 mg/dL      Glucose 127 mg/dL      Calcium 9 1 mg/dL      AST 14 U/L      ALT 16 U/L      Alkaline Phosphatase 94 U/L      Total Protein 7 4 g/dL      Albumin 4 2 g/dL      Total Bilirubin 0 60 mg/dL      eGFR 128 ml/min/1 73sq m     Narrative:      [place] guidelines for Chronic Kidney Disease (CKD):   •  Stage 1 with normal or high GFR (GFR > 90 mL/min/1 73 square meters)  •  Stage 2 Mild CKD (GFR = 60-89 mL/min/1 73 square meters)  •  Stage 3A Moderate CKD (GFR = 45-59 mL/min/1 73 square meters)  •  Stage 3B Moderate CKD (GFR = 30-44 mL/min/1 73 square meters)  •  Stage 4 Severe CKD (GFR = 15-29 mL/min/1 73 square meters)  •  Stage 5 End Stage CKD (GFR <15 mL/min/1 73 square meters)  Note: GFR calculation is accurate only with a steady state creatinine    CBC and differential [[id number]]  (Abnormal) Collected: 01/29/23 0342    Lab Status: Final result Specimen: Blood from Arm, Right Updated: 01/29/23 0350     WBC 10 84 Thousand/uL      RBC 5 33 Million/uL      Hemoglobin 15 4 g/dL      Hematocrit 45 9 %      MCV 86 fL      MCH 28 9 pg      MCHC 33 6 g/dL      RDW 12 2 %      MPV 10 5 fL      Platelets 048 Thousands/uL      nRBC 0 /100 WBCs      Neutrophils Relative 68 %      Immat GRANS % 0 %      Lymphocytes Relative 23 %      Monocytes Relative 7 %      Eosinophils Relative 2 %      Basophils Relative 0 %      Neutrophils Absolute 7 36 Thousands/µL      Immature Grans Absolute 0 04 Thousand/uL      Lymphocytes Absolute 2 48 Thousands/µL      Monocytes Absolute 0 78 Thousand/µL      Eosinophils Absolute 0 16 Thousand/µL      Basophils Absolute 0 02 Thousands/µL                  CT lower extremity w contrast left   Final Result by Catina Adames MD (01/29 0450)      Large narrowneck exostosis along the medial distal aspect of the left femur measuring up to 7 cm compatible with osteochondroma  Nonemergent contrast-enhanced MR examination recommended for further evaluation  The study was marked in EPIC for significant notification  Workstation performed: [id number]                    Procedures  Procedures         ED Course  ED Course as of 01/29/23 0513   Daivd Beyer Jan 29, 2023   0511 CT compatible with exostosis   patient advised he will require follow-up with orthopedic surgery, outpatient follow-up information provided                               SBIRT 22yo+    Flowsheet Row Most Recent Value   SBIRT (23 yo +)    In order to provide better care to our patients, we are screening all of our patients for alcohol and drug use  Would it be okay to ask you these screening questions? Yes Filed at: 01/29/2023 0354   Initial Alcohol Screen: US AUDIT-C     1  How often do you have a drink containing alcohol? 0 Filed at: 01/29/2023 0354   2  How many drinks containing alcohol do you have on a typical day you are drinking? 0 Filed at: 01/29/2023 0354   3a  Male UNDER 65: How often do you have five or more drinks on one occasion? 0 Filed at: 01/29/2023 0354   3b  FEMALE Any Age, or MALE 65+: How often do you have 4 or more drinks on one occassion? 0 Filed at: 01/29/2023 0354   Audit-C Score 0 Filed at: 01/29/2023 4856   MADDIE: How many times in the past year have you    Used an illegal drug or used a prescription medication for non-medical reasons?  Never Filed at: 01/29/2023 1929 Medical Decision Making  Exostosis of left femur: acute illness or injury  Amount and/or Complexity of Data Reviewed  External Data Reviewed: labs, radiology and notes  Details: Outside imaging performed on 7/20/2020 revealed an exostosis of the femur  Labs: ordered  Decision-making details documented in ED Course  Radiology: ordered and independent interpretation performed  Decision-making details documented in ED Course  Details: Exostosis of femur redemonstrated      Risk  Prescription drug management  Disposition  Final diagnoses:   Exostosis of left femur     Time reflects when diagnosis was documented in both MDM as applicable and the Disposition within this note     Time User Action Codes Description Comment    1/29/2023  5:11 AM Deadra Perfect Add [M66 [id number]] Exostosis of left femur       ED Disposition     ED Disposition   Discharge    Condition   Stable    Date/Time   Sun Jan 29, 2023  5:11 AM    Comment   Sarah Rasmussen discharge to home/self care  Follow-up Information     Follow up With Specialties Details Why Contact Info    Your primary care physician   As needed     [address] [facility] Surgery In 2 days  [address] [facility]  [phone]            Patient's Medications    No medications on file       No discharge procedures on file      PDMP Review     None          ED Provider  Electronically Signed by           Franklyn Caro MD  01/29/23 0200

## 2025-07-27 ENCOUNTER — APPOINTMENT (EMERGENCY)
Dept: RADIOLOGY | Facility: HOSPITAL | Age: 30
End: 2025-07-27
Payer: COMMERCIAL

## 2025-07-27 ENCOUNTER — APPOINTMENT (EMERGENCY)
Dept: CT IMAGING | Facility: HOSPITAL | Age: 30
End: 2025-07-27
Payer: COMMERCIAL

## 2025-07-27 ENCOUNTER — HOSPITAL ENCOUNTER (EMERGENCY)
Facility: HOSPITAL | Age: 30
Discharge: HOME | End: 2025-07-27
Attending: EMERGENCY MEDICINE | Admitting: EMERGENCY MEDICINE
Payer: COMMERCIAL

## 2025-07-27 VITALS
OXYGEN SATURATION: 100 % | RESPIRATION RATE: 17 BRPM | TEMPERATURE: 97.4 F | HEART RATE: 91 BPM | SYSTOLIC BLOOD PRESSURE: 144 MMHG | DIASTOLIC BLOOD PRESSURE: 83 MMHG

## 2025-07-27 DIAGNOSIS — T07.XXXA MULTIPLE SPRAINS: ICD-10-CM

## 2025-07-27 DIAGNOSIS — M79.10 MYALGIA: ICD-10-CM
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
## 2025-07-27 LAB
ABO GROUP BLD: NORMAL
ABO GROUP BLD: NORMAL
ANION GAP SERPL CALCULATED.3IONS-SCNC: 8 MMOL/L (ref 4–13)
APTT PPP: 26 SECONDS (ref 23–34)
BASOPHILS # BLD AUTO: 0.07 THOUSANDS/ÂΜL (ref 0–0.1)
BASOPHILS NFR BLD AUTO: 1 % (ref 0–1)
BLD GP AB SCN SERPL QL: NEGATIVE
BUN SERPL-MCNC: 11 MG/DL (ref 5–25)
CALCIUM SERPL-MCNC: 9 MG/DL (ref 8.4–10.2)
CHLORIDE SERPL-SCNC: 104 MMOL/L (ref 96–108)
CO2 SERPL-SCNC: 27 MMOL/L (ref 21–32)
CREAT SERPL-MCNC: 0.72 MG/DL (ref 0.6–1.3)
EOSINOPHIL # BLD AUTO: 0.2 THOUSAND/ÂΜL (ref 0–0.61)
EOSINOPHIL NFR BLD AUTO: 2 % (ref 0–6)
ERYTHROCYTE [DISTWIDTH] IN BLOOD BY AUTOMATED COUNT: 12.5 % (ref 11.6–15.1)
GFR SERPL CREATININE-BSD FRML MDRD: 126 ML/MIN/1.73SQ M
GLUCOSE SERPL-MCNC: 98 MG/DL (ref 65–140)
HCT VFR BLD AUTO: 45 % (ref 36.5–49.3)
HGB BLD-MCNC: 15.4 G/DL (ref 12–17)
IMM GRANULOCYTES # BLD AUTO: 0.15 THOUSAND/UL (ref 0–0.2)
IMM GRANULOCYTES NFR BLD AUTO: 1 % (ref 0–2)
INR PPP: 1.08 (ref 0.85–1.19)
LYMPHOCYTES # BLD AUTO: 3.66 THOUSANDS/ÂΜL (ref 0.6–4.47)
LYMPHOCYTES NFR BLD AUTO: 30 % (ref 14–44)
MCH RBC QN AUTO: 29.1 PG (ref 26.8–34.3)
MCHC RBC AUTO-ENTMCNC: 34.2 G/DL (ref 31.4–37.4)
MCV RBC AUTO: 85 FL (ref 82–98)
MONOCYTES # BLD AUTO: 1.35 THOUSAND/ÂΜL (ref 0.17–1.22)
MONOCYTES NFR BLD AUTO: 11 % (ref 4–12)
NEUTROPHILS # BLD AUTO: 6.97 THOUSANDS/ÂΜL (ref 1.85–7.62)
NEUTS SEG NFR BLD AUTO: 55 % (ref 43–75)
NRBC BLD AUTO-RTO: 0 /100 WBCS
PLATELET # BLD AUTO: 428 THOUSANDS/UL (ref 149–390)
PMV BLD AUTO: 10 FL (ref 8.9–12.7)
POTASSIUM SERPL-SCNC: 3.5 MMOL/L (ref 3.5–5.3)
PROTHROMBIN TIME: 14.5 SECONDS (ref 12.3–15)
RBC # BLD AUTO: 5.29 MILLION/UL (ref 3.88–5.62)
RH BLD: POSITIVE
RH BLD: POSITIVE
SODIUM SERPL-SCNC: 139 MMOL/L (ref 135–147)
SPECIMEN EXPIRATION DATE: NORMAL
WBC # BLD AUTO: 12.4 THOUSAND/UL (ref 4.31–10.16)

## 2025-07-27 PROCEDURE — 71045 X-RAY EXAM CHEST 1 VIEW: CPT

## 2025-07-27 PROCEDURE — 86900 BLOOD TYPING SEROLOGIC ABO: CPT | Performed by: EMERGENCY MEDICINE

## 2025-07-27 PROCEDURE — 86850 RBC ANTIBODY SCREEN: CPT | Performed by: EMERGENCY MEDICINE

## 2025-07-27 PROCEDURE — 72170 X-RAY EXAM OF PELVIS: CPT

## 2025-07-27 PROCEDURE — 73590 X-RAY EXAM OF LOWER LEG: CPT

## 2025-07-27 PROCEDURE — 85610 PROTHROMBIN TIME: CPT | Performed by: EMERGENCY MEDICINE

## 2025-07-27 PROCEDURE — 85025 COMPLETE CBC W/AUTO DIFF WBC: CPT | Performed by: EMERGENCY MEDICINE

## 2025-07-27 PROCEDURE — 72125 CT NECK SPINE W/O DYE: CPT

## 2025-07-27 PROCEDURE — 36415 COLL VENOUS BLD VENIPUNCTURE: CPT | Performed by: EMERGENCY MEDICINE

## 2025-07-27 PROCEDURE — 85730 THROMBOPLASTIN TIME PARTIAL: CPT | Performed by: EMERGENCY MEDICINE

## 2025-07-27 PROCEDURE — 80048 BASIC METABOLIC PNL TOTAL CA: CPT | Performed by: EMERGENCY MEDICINE

## 2025-07-27 PROCEDURE — 74177 CT ABD & PELVIS W/CONTRAST: CPT

## 2025-07-27 PROCEDURE — 71260 CT THORAX DX C+: CPT

## 2025-07-27 PROCEDURE — 70450 CT HEAD/BRAIN W/O DYE: CPT

## 2025-07-27 PROCEDURE — 99285 EMERGENCY DEPT VISIT HI MDM: CPT | Performed by: EMERGENCY MEDICINE

## 2025-07-27 PROCEDURE — 86901 BLOOD TYPING SEROLOGIC RH(D): CPT | Performed by: EMERGENCY MEDICINE

## 2025-07-27 PROCEDURE — 99284 EMERGENCY DEPT VISIT MOD MDM: CPT

## 2025-07-27 RX ORDER — CYCLOBENZAPRINE HCL 10 MG
10 TABLET ORAL 2 TIMES DAILY PRN
Qty: 20 TABLET | Refills: 0 | Status: SHIPPED | OUTPATIENT
Start: 2025-07-27

## 2025-07-27 RX ORDER — IBUPROFEN 800 MG/1
800 TABLET, FILM COATED ORAL 3 TIMES DAILY
Qty: 21 TABLET | Refills: 0 | Status: SHIPPED | OUTPATIENT
Start: 2025-07-27

## 2025-07-27 RX ADMIN — IOHEXOL 100 ML: 350 INJECTION, SOLUTION INTRAVENOUS at 11:17
